# Patient Record
(demographics unavailable — no encounter records)

---

## 2019-08-20 NOTE — PHYS DOC
Past Medical History


Past Medical History:  No Pertinent History


Past Surgical History:  No Surgical History


Alcohol Use:  None


Drug Use:  None





General Pediatric Assessment


Chief Complaint


Chief Complaint


LLQ abdominal/pelvic pain





History of Present Illness


History of Present Illness





Patient is a 15-year-old AA female, accompanied by her older brother, who 

presents to the emergency department via EMS with complaints of lower left 

abdominal pain for the last 2 days. She reports increased urinary frequency. She

denies any dysuria, hematuria, foul smelling urine, irregular vaginal discharge,

or vaginal odor. Patient denies any back pain or difficulty initiating urine 

stream. He rates her pain a 5 out of 10 on the pain scale, she denies any 

alleviating factors. The pain increases with palpation. She reports that her 

last menstrual period was approximately one week ago.





Historian was the patient





Review of Systems


Review of Systems





Constitutional: Denies fever or chills []


Eyes: Denies change in visual acuity, redness, or eye pain []


HENT: Denies nasal congestion or sore throat []


Respiratory: Denies cough or shortness of breath []


Cardiovascular: No additional information not addressed in HPI []


GI: See history of present illness


: Denies dysuria or hematuria; reports increased urinary frequency see history

 of present illness []


Musculoskeletal: Denies back pain or joint pain []


Integument: Denies rash or skin lesions []


Neurologic: Denies headache, focal weakness or sensory changes []








Complete systems were reviewed and found to be within normal limits, except as 

documented in this note.





Allergies


Allergies





Allergies








Coded Allergies Type Severity Reaction Last Updated Verified


 


  No Known Drug Allergies    8/20/19 No











Physical Exam


Physical Exam





Constitutional: Well developed, well nourished, no acute distress, non-toxic 

appearance, positive interaction, playful. []


HENT: Normocephalic, atraumatic, bilateral external ears normal, oropharynx 

moist, no oral exudates, nose normal. [] 


Eyes: PERRLA, conjunctiva normal, no discharge. []


Neck: Normal range of motion, no tenderness, supple, no stridor. []


Cardiovascular: Normal heart rate, normal rhythm, no murmurs, no rubs, no 

gallops. []


Thorax and Lungs: Normal breath sounds, no respiratory distress, no wheezing, no

 chest tenderness, no retractions, no accessory muscle use. []


Abdomen: Bowel sounds normal, soft, no rebound tenderness, no guarding,  no 

masses; LLQ tenderness to palpation


Skin: Warm, dry, no erythema, no rash. []


Back: No tenderness, no CVA tenderness. []


Extremities: No cyanosis, ROM intact, no edema, no deformities. [] 


Neurologic: Alert and interactive, no focal deficits noted. []


Vital Signs





                                   Vital Signs








  Date Time  Temp Pulse Resp B/P (MAP) Pulse Ox O2 Delivery O2 Flow Rate FiO2


 


8/20/19 11:27 98.6  17  95   





 98.6       











Radiology/Procedures


Radiology/Procedures


PROCEDURE: PELVIS ULTRASOUND





EXAM: Pelvic sonogram.


 


HISTORY: Left adnexal pain.


 


TECHNIQUE: Sonographic imaging of the pelvis was performed.


 


COMPARISON: None.


 


FINDINGS: The uterus measures 6.1 x 4.2 x 2.9 cm. The endometrial stripe 


measures 1.7 mm in thickness. The ovaries are normal in size and 


demonstrate normal blood flow. There are nonspecific loops of bowel within


the left adnexa at the site of pain. There is no pelvic free fluid.


 


IMPRESSION:


1. Nonspecific bowel loops within the left lower adnexa at the site of 


pain.


2. Thin endometrial stripe.


3. Otherwise, unremarkable pelvic sonogram.


 []





Labs


Current Patient Data





                                Laboratory Tests








Test


 8/20/19


11:33


 


Urine Collection Type Unknown  


 


Urine Color Yellow  


 


Urine Clarity Clear  


 


Urine pH 6.5  


 


Urine Specific Gravity 1.015  


 


Urine Protein


 30 mg/dL


(NEG-TRACE)


 


Urine Glucose (UA)


 Negative mg/dL


(NEG)


 


Urine Ketones (Stick)


 Negative mg/dL


(NEG)


 


Urine Blood


 Negative (NEG)





 


Urine Nitrite


 Negative (NEG)





 


Urine Bilirubin


 Negative (NEG)





 


Urine Urobilinogen Dipstick


 1.0 mg/dL (0.2


mg/dL)


 


Urine Leukocyte Esterase Small (NEG)  


 


Urine RBC


 Occ /HPF (0-2)





 


Urine WBC


 5-10 /HPF


(0-4)


 


Urine Squamous Epithelial


Cells Many /LPF  





 


Urine Bacteria


 Moderate /HPF


(0-FEW)


 


POC Urine HCG, Qualitative


 Hcg negative


(Negative)











Course & Med Decision Making


Course & Med Decision Making


Pertinent Labs and Imaging studies reviewed. (See chart for details)


dx: UTI, nonspecific abdominal pain


Pelvic US negative for any acute findings. UA concerning for UTI with patient 

report of increased urinary frequency. VSS. 


Prescription for keflex 500 mg po bid x7 days, follow up with PCP if sx persist.

 


PT and brother verbalized an understanding of home care, medications, follow-up,

 and return to ED instructions and were in agreement with the plan of care.


[]





Laboratory


Lab Results





Laboratory Tests








Test


 8/20/19


11:33


 


Urine Collection Type Unknown 


 


Urine Color Yellow 


 


Urine Clarity Clear 


 


Urine pH 6.5 


 


Urine Specific Gravity 1.015 


 


Urine Protein


 30 mg/dL


(NEG-TRACE)


 


Urine Glucose (UA)


 Negative mg/dL


(NEG)


 


Urine Ketones (Stick)


 Negative mg/dL


(NEG)


 


Urine Blood Negative (NEG) 


 


Urine Nitrite Negative (NEG) 


 


Urine Bilirubin Negative (NEG) 


 


Urine Urobilinogen Dipstick


 1.0 mg/dL (0.2


mg/dL)


 


Urine Leukocyte Esterase Small (NEG) 


 


Urine RBC Occ /HPF (0-2) 


 


Urine WBC


 5-10 /HPF


(0-4)


 


Urine Squamous Epithelial


Cells Many /LPF 





 


Urine Bacteria


 Moderate /HPF


(0-FEW)


 


Bedside Urine HCG, Qualitative


 Hcg negative


(Negative)








Laboratory Tests








Test


 8/20/19


11:33


 


Urine Collection Type Unknown 


 


Urine Color Yellow 


 


Urine Clarity Clear 


 


Urine pH 6.5 


 


Urine Specific Gravity 1.015 


 


Urine Protein


 30 mg/dL


(NEG-TRACE)


 


Urine Glucose (UA)


 Negative mg/dL


(NEG)


 


Urine Ketones (Stick)


 Negative mg/dL


(NEG)


 


Urine Blood Negative (NEG) 


 


Urine Nitrite Negative (NEG) 


 


Urine Bilirubin Negative (NEG) 


 


Urine Urobilinogen Dipstick


 1.0 mg/dL (0.2


mg/dL)


 


Urine Leukocyte Esterase Small (NEG) 


 


Urine RBC Occ /HPF (0-2) 


 


Urine WBC


 5-10 /HPF


(0-4)


 


Urine Squamous Epithelial


Cells Many /LPF 





 


Urine Bacteria


 Moderate /HPF


(0-FEW)


 


Bedside Urine HCG, Qualitative


 Hcg negative


(Negative)











Dragon Disclaimer


Dragon Disclaimer


This electronic medical record was generated, in whole or in part, using a voice

 recognition dictation system.





Departure


Departure


Impression:  


   Primary Impression:  


   Urinary tract infection


   Additional Impression:  


   Pelvic pain


Disposition:  01 HOME, SELF-CARE


Condition:  STABLE


Referrals:  


NO PCP (PCP)


Patient Instructions:  Urinary Tract Infection, Easy-to-Read





Additional Instructions:  


Fill prescription(s) and use as directed. Avoid bladder irritants such as 

caffeine, carbonation, and spicy foods. Increase clear fluids. Follow up with 

your primary care doctor if symptoms persist, return to the ER if symptoms wors

en.


Scripts


Cephalexin (KEFLEX) 500 Mg Capsule


1 CAP PO BID, #14 CAP 0 Refills


   Prov: DONITA COMER APRN         8/20/19





Problem Qualifiers








   Primary Impression:  


   Urinary tract infection


   Urinary tract infection type:  site unspecified  Hematuria presence:  without

    hematuria  Qualified Codes:  N39.0 - Urinary tract infection, site not 

   specified








DONITA COMER APRN       Aug 20, 2019 13:33

## 2019-08-20 NOTE — RAD
EXAM: Pelvic sonogram.

 

HISTORY: Left adnexal pain.

 

TECHNIQUE: Sonographic imaging of the pelvis was performed.

 

COMPARISON: None.

 

FINDINGS: The uterus measures 6.1 x 4.2 x 2.9 cm. The endometrial stripe 

measures 1.7 mm in thickness. The ovaries are normal in size and 

demonstrate normal blood flow. There are nonspecific loops of bowel within

the left adnexa at the site of pain. There is no pelvic free fluid.

 

IMPRESSION:

1. Nonspecific bowel loops within the left lower adnexa at the site of 

pain.

2. Thin endometrial stripe.

3. Otherwise, unremarkable pelvic sonogram.

 

Electronically signed by: Meg Stallings MD (8/20/2019 1:17 PM) [id number]